# Patient Record
Sex: MALE | Race: AMERICAN INDIAN OR ALASKA NATIVE
[De-identification: names, ages, dates, MRNs, and addresses within clinical notes are randomized per-mention and may not be internally consistent; named-entity substitution may affect disease eponyms.]

---

## 2019-02-26 ENCOUNTER — HOSPITAL ENCOUNTER (EMERGENCY)
Dept: HOSPITAL 5 - ED | Age: 54
Discharge: HOME | End: 2019-02-26
Payer: COMMERCIAL

## 2019-02-26 VITALS — SYSTOLIC BLOOD PRESSURE: 152 MMHG | DIASTOLIC BLOOD PRESSURE: 105 MMHG

## 2019-02-26 DIAGNOSIS — E11.9: ICD-10-CM

## 2019-02-26 DIAGNOSIS — N43.1: Primary | ICD-10-CM

## 2019-02-26 DIAGNOSIS — I10: ICD-10-CM

## 2019-02-26 DIAGNOSIS — F17.200: ICD-10-CM

## 2019-02-26 LAB
BACTERIA #/AREA URNS HPF: (no result) /HPF
BILIRUB UR QL STRIP: (no result)
BLOOD UR QL VISUAL: (no result)
MUCOUS THREADS #/AREA URNS HPF: (no result) /HPF
PH UR STRIP: 5 [PH] (ref 5–7)
RBC #/AREA URNS HPF: 5 /HPF (ref 0–6)
UROBILINOGEN UR-MCNC: < 2 MG/DL (ref ?–2)
WBC #/AREA URNS HPF: 1 /HPF (ref 0–6)

## 2019-02-26 PROCEDURE — 96372 THER/PROPH/DIAG INJ SC/IM: CPT

## 2019-02-26 PROCEDURE — 99284 EMERGENCY DEPT VISIT MOD MDM: CPT

## 2019-02-26 PROCEDURE — 81001 URINALYSIS AUTO W/SCOPE: CPT

## 2019-02-26 PROCEDURE — 93975 VASCULAR STUDY: CPT

## 2019-02-26 NOTE — ULTRASOUND REPORT
FINAL REPORT



EXAM:  US TESTICULAR DOPPLER COMP



HISTORY:  right testicular swelling and cristine pain 



TECHNIQUE:  Grayscale, color flow and Doppler waveform imaging of the scrotal contents was performed.
 



Comparison: None 



FINDINGS:  

Right scrotum:



The right testicle measures 3.1 centimeters by a 3.5 centimeters x 2.7 centimeters. There is no demon
stration of a focal mass.



Arterial and venous flow is demonstrated in the right testicle utilizing color flow and Doppler wavef
orm imaging. 



The right epididymis head measures 7 millimeters and is unremarkable in appearance.



There is a large right hydrocele which contains echogenic foci suggestive of debris. No septations ar
e demonstrated. 



Left scrotum:



The left testicle measures 3 centimeters by 3.5 centimeters x 2.7 centimeters. There is no demonstrat
ion of a focal mass.



Arterial and venous flow is demonstrated in the left testicle utilizing color flow and Doppler wavefo
rm imaging.



The left epididymis measures 8 millimeters and is unremarkable in appearance.



There is a large left hydrocele which contains echogenic foci suggestive of debris. No septations are
 demonstrated. 



In the left inguinal region there is heterogeneous density that is not well investigated with this st
udy but may represent an inguinal hernia.



IMPRESSION:  

1. Bilateral hydroceles which appear to contain debris. This can be seen with chronic hydroceles whic
h contain cholesterol crystals or inflammatory hydroceles which may contain debris. 



2. No evidence of testicular torsion and no ultrasound evidence of testicular abscess. 



3. Possible left inguinal hernia. This is not well investigated with this study. 



If there is a clinical concern for incarcerated hernia, CT abdomen and pelvis would be helpful.

## 2019-02-26 NOTE — EMERGENCY DEPARTMENT REPORT
Blank Doc





- Documentation


Documentation: 





This is a 53 y.o. male that presents with abdominal pain radiating into testicle

for 3 days.  He reports pain started in right testicle which switched to left 

testicle.  There is swelling on right.  He reports pain is worse with sitting.





Ordered: Labs and testicular US

## 2019-11-01 ENCOUNTER — HOSPITAL ENCOUNTER (EMERGENCY)
Dept: HOSPITAL 5 - ED | Age: 54
LOS: 1 days | Discharge: HOME | End: 2019-11-02
Payer: SELF-PAY

## 2019-11-01 DIAGNOSIS — N43.3: Primary | ICD-10-CM

## 2019-11-01 DIAGNOSIS — E11.65: ICD-10-CM

## 2019-11-01 DIAGNOSIS — F17.200: ICD-10-CM

## 2019-11-01 DIAGNOSIS — I10: ICD-10-CM

## 2019-11-01 DIAGNOSIS — Z98.890: ICD-10-CM

## 2019-11-01 LAB
ALBUMIN SERPL-MCNC: 3.8 G/DL (ref 3.9–5)
ALT SERPL-CCNC: 16 UNITS/L (ref 7–56)
BASOPHILS # (AUTO): 0.1 K/MM3 (ref 0–0.1)
BASOPHILS NFR BLD AUTO: 0.6 % (ref 0–1.8)
BILIRUB UR QL STRIP: (no result)
BLOOD UR QL VISUAL: (no result)
BUN SERPL-MCNC: 16 MG/DL (ref 9–20)
BUN/CREAT SERPL: 13 %
CALCIUM SERPL-MCNC: 9 MG/DL (ref 8.4–10.2)
EOSINOPHIL # BLD AUTO: 0.1 K/MM3 (ref 0–0.4)
EOSINOPHIL NFR BLD AUTO: 0.5 % (ref 0–4.3)
HCT VFR BLD CALC: 59.5 % (ref 35.5–45.6)
HEMOLYSIS INDEX: 28
HGB BLD-MCNC: 19.3 GM/DL (ref 11.8–15.2)
LYMPHOCYTES # BLD AUTO: 1.6 K/MM3 (ref 1.2–5.4)
LYMPHOCYTES NFR BLD AUTO: 13.1 % (ref 13.4–35)
MCHC RBC AUTO-ENTMCNC: 32 % (ref 32–34)
MCV RBC AUTO: 91 FL (ref 84–94)
MONOCYTES # (AUTO): 0.8 K/MM3 (ref 0–0.8)
MONOCYTES % (AUTO): 6.7 % (ref 0–7.3)
MUCOUS THREADS #/AREA URNS HPF: (no result) /HPF
PH UR STRIP: 5 [PH] (ref 5–7)
PLATELET # BLD: 264 K/MM3 (ref 140–440)
RBC # BLD AUTO: 6.53 M/MM3 (ref 3.65–5.03)
RBC #/AREA URNS HPF: 4 /HPF (ref 0–6)
UROBILINOGEN UR-MCNC: < 2 MG/DL (ref ?–2)
WBC #/AREA URNS HPF: < 1 /HPF (ref 0–6)

## 2019-11-01 PROCEDURE — 36415 COLL VENOUS BLD VENIPUNCTURE: CPT

## 2019-11-01 PROCEDURE — 74176 CT ABD & PELVIS W/O CONTRAST: CPT

## 2019-11-01 PROCEDURE — 80053 COMPREHEN METABOLIC PANEL: CPT

## 2019-11-01 PROCEDURE — 81001 URINALYSIS AUTO W/SCOPE: CPT

## 2019-11-01 PROCEDURE — 93975 VASCULAR STUDY: CPT

## 2019-11-01 PROCEDURE — 87086 URINE CULTURE/COLONY COUNT: CPT

## 2019-11-01 PROCEDURE — 85025 COMPLETE CBC W/AUTO DIFF WBC: CPT

## 2019-11-01 NOTE — EMERGENCY DEPARTMENT REPORT
Chief Complaint: Urogenital-Male


Stated Complaint: POSS HERNIA





- HPI


History of Present Illness: 


53yo BM states that he has abdominal and L testicle pain that began earlier 

today after straining to use the restroom. He states that he was told a while 

ago that he has suspicion for a hernia but never f/u.








- Exam


Vital Signs: 


                                   Vital Signs











  11/01/19





  17:47


 


Temperature 98.9 F


 


Pulse Rate 91 H


 


Respiratory 18





Rate 


 


Blood Pressure 130/86


 


O2 Sat by Pulse 89





Oximetry 











MSE screening note: 


Focused history and physical exam performed.


Due to findings the following was ordered:











ED Disposition for MSE


Condition: Stable

## 2019-11-01 NOTE — CAT SCAN REPORT
CT ABDOMEN AND PELVIS WITHOUT CONTRAST, 11/1/2019



INDICATION: Generalized abdominal pain and testicular pain



TECHNICAL: Multiple axial CT images of the abdomen and pelvis were acquired without intravenous contr
ast.  Sagittal and coronal reformats were obtained.  All CTs at this facility utilize dose reduction 
techniques including automated exposure control, iterative reconstruction and weight based dosing whe
n appropriate to reduce patient radiation dose to as low as reasonable achievable.



COMPARISON: None



FINDINGS:

Limited imaging of the bilateral lung bases demonstrate no evidence of acute abnormality.



Abdomen: The liver, gallbladder, spleen, pancreas, bilateral adrenal glands and bilateral kidneys sagrario
w no evidence of acute abnormality within the limitations of today's noncontrast study. The large and
 small bowel are normal in caliber. There is no free fluid. The appendix is not clearly identified, b
ut no focal right lower quadrant inflammatory changes are identified.



Pelvis: No free fluid is seen within the pelvis. The urinary bladder is decompressed and not well-vis
ualized.



Evaluation of bony structures demonstrates no evidence of acute bony abnormality



IMPRESSION:

1. No evidence of acute inflammatory or obstructive process within the abdomen or pelvis.



Signer Name: Lana Mcgovern MD 

Signed: 11/1/2019 8:17 PM

 Workstation Name: [id number]

## 2019-11-01 NOTE — EMERGENCY DEPARTMENT REPORT
ED Male  HPI





- General


Chief complaint: Urogenital-Male


Stated complaint: POSS HERNIA


Time Seen by Provider: 19 20:37


Source: patient


Mode of arrival: Ambulatory


Limitations: No Limitations





- History of Present Illness


Initial comments: 





This is a 54-year-old male here for testicular pain and report that he was here 

in the past for the same thing.  He said his left testicle is swollen.  Patient 

is a diabetic.  He said he was prescribed metformin but he does not take it.  

Pain is 6 out of 10 and feels sore and have the.  Patient was here in the past 

with similar issue and was diagnosed with hydrocele.  Patient also said that 

they referred him to a doctor but something got messed up with his insurance and

his insurance is not going to be active until this upcoming January.  There was 

mention of possible hernia but he said that they told him he had a hernia but 

when he came to have the surgery told him that he did not have one in previous 

CT scan did not show any hernia.  He has a history of high blood pressure.  

Denies any penile discharge.  Patient is in the room with his wife.  Denies any 

urinary symptoms or any back pain.  Pain is localized to his left testicle.  

Denies taking any medication


MD Complaint: testicle pain, testicle swelling


Onset/Timin


-: month(s)


Location: left testicle


Radiation: none


Severity: moderate


Severity scale (0 -10): 5


Quality: other (sore)


Consistency: intermittent


Improves with: none


Worsens with: palpation


swelling.  denies: discharge, mass, rash, urinary retention, blood in urine, 

dysuria, fever, nausea/vomiting, incontinence





- Related Data


Sexually active: Yes (with his wife)


                                  Previous Rx's











 Medication  Instructions  Recorded  Last Taken  Type


 


Triamter/Hctz 37.5-25 mg 1 tab PO QDAY #30 tablet 14 Unknown Rx





[Maxzide-25]    


 


Ibuprofen [Motrin 600 MG tab] 600 mg PO Q8H PRN #30 tablet 11/26/15 Unknown Rx


 


HYDROcodone/APAP 5-325 [[place] 1 each PO Q6HR PRN #15 tablet 19 Unknown Rx





5/325]    


 


Doxycycline Hyclate [Doxycycline 100 mg PO Q12HR 10 Days #20 tab 19 

Unknown Rx





Hyclate TAB]    


 


Nystatin Cream [Mycostatin Cream] 1 applic TP BID 7 Days #1 tube 19 

Unknown Rx











                                    Allergies











Allergy/AdvReac Type Severity Reaction Status Date / Time


 


No Known Allergies Allergy   Verified 19 14:08














ED Review of Systems


ROS: 


Stated complaint: POSS HERNIA


Other details as noted in HPI





Constitutional: denies: chills, fever


ENT: denies: throat pain, congestion


Respiratory: denies: cough, wheezing


Cardiovascular: denies: chest pain, palpitations, edema, syncope


Gastrointestinal: denies: abdominal pain, nausea, vomiting


Genitourinary: testicular pain.  denies: urgency, dysuria, frequency, hematuria,

discharge, testicular mass


Musculoskeletal: denies: back pain, joint swelling, arthralgia, myalgia


Skin: denies: rash


Neurological: denies: headache, abnormal gait, vertigo





ED Past Medical Hx





- Past Medical History


Previous Medical History?: Yes


Hx Hypertension: Yes


Hx Diabetes: Yes


Additional medical history: on a blood thinner





- Surgical History


Past Surgical History?: Yes


Additional Surgical History: R knee surgery





- Family History


Family history: diabetes, hypertension





- Social History


Smoking Status: Current Every Day Smoker


Substance Use Type: None





- Medications


Home Medications: 


                                Home Medications











 Medication  Instructions  Recorded  Confirmed  Last Taken  Type


 


Triamter/Hctz 37.5-25 mg 1 tab PO QDAY #30 tablet 06/20/14 11/26/15 Unknown Rx





[Maxzide-25]     


 


Ibuprofen [Motrin 600 MG tab] 600 mg PO Q8H PRN #30 tablet 11/26/15  Unknown Rx


 


HYDROcodone/APAP 5-325 [[place] 1 each PO Q6HR PRN #15 tablet 19  Unknown Rx





5/325]     


 


Doxycycline Hyclate [Doxycycline 100 mg PO Q12HR 10 Days #20 tab 19  

Unknown Rx





Hyclate TAB]     


 


Nystatin Cream [Mycostatin Cream] 1 applic TP BID 7 Days #1 tube 19  

Unknown Rx














ED Physical Exam





- General


Limitations: No Limitations


General appearance: alert, in no apparent distress





- Head


Head exam: Present: atraumatic, normocephalic





- Eye


Eye exam: Present: normal appearance, PERRL





- ENT


ENT exam: Present: normal exam, normal orophraynx





- Respiratory


Respiratory exam: Present: normal lung sounds bilaterally.  Absent: respiratory 

distress, chest wall tenderness





- Cardiovascular


Cardiovascular Exam: Present: regular rate, normal rhythm, normal heart sounds





- GI/Abdominal


GI/Abdominal exam: Present: soft, normal bowel sounds.  Absent: distended, 

tenderness, guarding, rebound, rigid, organomegaly, mass, bruit, pulsatile mass,

hernia





- 


 exam: Present: testicular tenderness (left), scrotal swelling (left, mild).  

Absent: urethral discharge, vertical testicular lie


External exam: Present: swelling (left scrotal area).  Absent: erythema, 

lesions, lacerations, ecchymosis, bleeding





- Expanded  Exam


  ** Expanded


Male  exam: Absent: phimosis, paraphimosis, penile swelling, lesions, 

induration, erythema, perineal induration, balanitis


 exam: Testicular Tenderness: Left, Testicular Swelling: Left





- Extremities Exam


Extremities exam: Present: normal inspection, full ROM.  Absent: pedal edema





- Back Exam


Back exam: Present: normal inspection, full ROM





- Neurological Exam


Neurological exam: Present: alert, oriented X3, normal gait





- Psychiatric


Psychiatric exam: Present: normal affect, normal mood





- Skin


Skin exam: Present: warm, dry, intact, rash, other (patient has dark circular 

rash to bilateral inner thigh and perineal area that looks like a fungal rash.  

No erythema or nontender to palpate.  He reports that sometimes it itches.).  

Absent: normal color





ED Course


                                   Vital Signs











  19





  17:47


 


Temperature 98.9 F


 


Pulse Rate 91 H


 


Respiratory 18





Rate 


 


Blood Pressure 130/86


 


O2 Sat by Pulse 89





Oximetry 














- Reevaluation(s)


Reevaluation #1: 





19 01:17





 stable throughout ED course with no acute distress.








ED Medical Decision Making





- Lab Data


Result diagrams: 


                                 19 18:56





                                 19 18:56








                                   Lab Results











  19 Range/Units





  18:56 18:56 Unknown 


 


WBC  12.0 H    (4.5-11.0)  K/mm3


 


RBC  6.53 H    (3.65-5.03)  M/mm3


 


Hgb  19.3 H    (11.8-15.2)  gm/dl


 


Hct  59.5 H    (35.5-45.6)  %


 


MCV  91    (84-94)  fl


 


MCH  30    (28-32)  pg


 


MCHC  32    (32-34)  %


 


RDW  17.4 H    (13.2-15.2)  %


 


Plt Count  264    (140-440)  K/mm3


 


Lymph % (Auto)  13.1 L    (13.4-35.0)  %


 


Mono % (Auto)  6.7    (0.0-7.3)  %


 


Eos % (Auto)  0.5    (0.0-4.3)  %


 


Baso % (Auto)  0.6    (0.0-1.8)  %


 


Lymph #  1.6    (1.2-5.4)  K/mm3


 


Mono #  0.8    (0.0-0.8)  K/mm3


 


Eos #  0.1    (0.0-0.4)  K/mm3


 


Baso #  0.1    (0.0-0.1)  K/mm3


 


Seg Neutrophils %  79.1 H    (40.0-70.0)  %


 


Seg Neutrophils #  9.5 H    (1.8-7.7)  K/mm3


 


Sodium   139   (137-145)  mmol/L


 


Potassium   4.3   (3.6-5.0)  mmol/L


 


Chloride   97.8 L   ()  mmol/L


 


Carbon Dioxide   29   (22-30)  mmol/L


 


Anion Gap   17   mmol/L


 


BUN   16   (9-20)  mg/dL


 


Creatinine   1.2   (0.8-1.5)  mg/dL


 


Estimated GFR   > 60   ml/min


 


BUN/Creatinine Ratio   13   %


 


Glucose   261 H   ()  mg/dL


 


Calcium   9.0   (8.4-10.2)  mg/dL


 


Total Bilirubin   0.50   (0.1-1.2)  mg/dL


 


AST   16   (5-40)  units/L


 


ALT   16   (7-56)  units/L


 


Alkaline Phosphatase   99   ()  units/L


 


Total Protein   7.8   (6.3-8.2)  g/dL


 


Albumin   3.8 L   (3.9-5)  g/dL


 


Albumin/Globulin Ratio   1.0   %


 


Urine Color    Yellow  (Yellow)  


 


Urine Turbidity    Clear  (Clear)  


 


Urine pH    5.0  (5.0-7.0)  


 


Ur Specific Gravity    1.028  (1.003-1.030)  


 


Urine Protein    100 mg/dl  (Negative)  mg/dL


 


Urine Glucose (UA)    Neg  (Negative)  mg/dL


 


Urine Ketones    Neg  (Negative)  mg/dL


 


Urine Blood    Neg  (Negative)  


 


Urine Nitrite    Neg  (Negative)  


 


Urine Bilirubin    Neg  (Negative)  


 


Urine Urobilinogen    < 2.0  (<2.0)  mg/dL


 


Ur Leukocyte Esterase    Neg  (Negative)  


 


Urine WBC (Auto)    < 1.0  (0.0-6.0)  /HPF


 


Urine RBC (Auto)    4.0  (0.0-6.0)  /HPF


 


U Epithel Cells (Auto)    < 1.0  (0-13.0)  /HPF


 


Urine Mucus    Few  /HPF














- Radiology Data


Radiology results: report reviewed


[facility] CT scan of the abdomen and pelvis with contrast that shows no ab

normality.  The ultrasound shows right and left hydrocele .  Left is greater 

than right .  These see below for details


Findings


[facility]


[address] [place] [address]





Ultrasound Report


Signed





Patient: FAVIAN PATTERSON JR MR#: [id number]


[id number]


: 1965 Acct:[id number]





Age/Sex: 54 / M ADM Date: 19





Loc: ED


Attending Dr:








Ordering Physician: ARYA ROPER


Date of Service: 19


Procedure(s): US testicular doppler comp


Accession Number(s): [id number]





cc: ARYA ROPER








ULTRASOUND SCROTUM





INDICATION / CLINICAL INFORMATION:


testicular pain.





COMPARISON:


CT dated 19. Ultrasound dated 19





FINDINGS -- RIGHT


TESTIS: Size = 4.0 x 2.2 x 2.7 cm.


- Appearance: No significant abnormality.


- Cyst or Mass: None.


- Color Doppler Flow: No significant abnormality.


EPIDIDYMIS: No significant abnormality.


HYDROCELE: Small


VARICOCELE: None demonstrated.





FINDINGS -- LEFT


TESTIS: Size = 3.6 x 2.4 x 2.4 cm.


- Appearance: No significant abnormality.


- Cyst or Mass: None.


- Color Doppler Flow: No significant abnormality.


EPIDIDYMIS: No significant abnormality.


HYDROCELE: Moderate


VARICOCELE: None demonstrated.





ADDITIONAL FINDINGS: None.





IMPRESSION:


1. Moderate left and small right hydroceles similar to that seen on previous 

study.





Signer Name: SHAY De Luna MD


Signed: 2019 12:36 AM


Workstation Name: [id number]








Transcribed By: DT


Dictated By: Jonnathan De Luna MD


Electronically Authenticated By: Jonnathan De Luna MD


Signed Date/Time: 19











DD/DT: 19


TD/TT:








Findings


[facility]


[address] [place] [address]





Cat Scan Report


Signed





Patient: FAVIAN PATTERSON JR MR#: [id number]


[id number]


: 1965 Acct:[id number]





Age/Sex: 54 / M ADM Date: 19





Loc: ED


Attending Dr:








Ordering Physician: JACKELIN MADRID PA-C


Date of Service: 19


Procedure(s): CT abdomen pelvis wo con


Accession Number(s): [id number]





cc: JACKELIN MADRID PA-C








CT ABDOMEN AND PELVIS WITHOUT CONTRAST, 2019





INDICATION: Generalized abdominal pain and testicular pain





TECHNICAL: Multiple axial CT images of the abdomen and pelvis were acquired 

without intravenous


contrast. Sagittal and coronal reformats were obtained. All CTs at this facility

utilize dose


reduction techniques including automated exposure control, iterative 

reconstruction and weight based


dosing when appropriate to reduce patient radiation dose to as low as reasonable

achievable.





COMPARISON: None





FINDINGS:


Limited imaging of the bilateral lung bases demonstrate no evidence of acute 

abnormality.





Abdomen: The liver, gallbladder, spleen, pancreas, bilateral adrenal glands and 

bilateral kidneys


show no evidence of acute abnormality within the limitations of today's 

noncontrast study. The large


and small bowel are normal in caliber. There is no free fluid. The appendix is 

not clearly


identified, but no focal right lower quadrant inflammatory changes are 

identified.





Pelvis: No free fluid is seen within the pelvis. The urinary bladder is 

decompressed and not well-


visualized.





Evaluation of bony structures demonstrates no evidence of acute bony abnormality





IMPRESSION:


1. No evidence of acute inflammatory or obstructive process within the abdomen 

or pelvis.





Signer Name: Lana Mcgovern MD


Signed: 2019 8:17 PM


Workstation Name: [id number]








Transcribed By: EB


Dictated By: Lana Mcgovern MD


Electronically Authenticated By: Lana Mcgovern MD


Signed Date/Time: 19











DD/DT: 19


TD/TT:





- Medical Decision Making





This is a 54-year-old male came to the hospital concerned about left scrotal 

pain and found to have a hydrocele which is chronic on previous ultrasound  

patient had hydrocele and he was referred to urologist because his insurance got

messed up and he will be able to go until this January.  He denies any urinary s

ymptoms.  He was given antibiotics on the last visit referral which she did not 

follow-up.  CBC with mild elevation in white count, CMP with blood glucose 261 

with protein in urine I discussed the patient that he will need to start taking 

his metformin as was previously prescribed and he voiced understanding.  I also 

discussed the ultrasound results which shows left and right hydrocele and CT 

scan of the abdomen and pelvis was normal exam without any hernia.  I discussed 

with him that he has a fungal rash which is like jock itch and that I will 

prescribe a cream for him to put on the area twice daily.  She is stable he 

voiced understanding of diagnosis and treatment plan and that he will follow up 

at [facility] for primary care and urology to manage his chronic 

hydrocele.  Discharged home with prescription for doxycycline and nystatin cream





- Differential Diagnosis


testicular mass, testicular torsion, inguinal hernia, hydrocele


Critical care attestation.: 


If time is entered above; I have spent that time in minutes in the direct care 

of this critically ill patient, excluding procedure time.








ED Disposition


Clinical Impression: 


 Hydrocele, bilateral, Pain in left testicle





Hyperglycemia due to type 2 diabetes mellitus


Qualifiers:


 Diabetes mellitus long term insulin use: without long term use Qualified 

Code(s): E11.65 - Type 2 diabetes mellitus with hyperglycemia





Disposition: - TO HOME OR SELFCARE


Is pt being admited?: No


Does the pt Need Aspirin: No


Condition: Stable


Instructions:  Diabetes Mellitus Type 2 in Adults (ED), Testicle Pain (ED), 

Hydrocele (ED)


Additional Instructions: 


Please start taking her metformin as previously prescribed.


See referral to [facility] and also to urologist for follow-up 

visit for chronic medical problems.


If you condition worsens, please return to emergency room


You have a fungal infection in your groin area and he will need to apply 

antifungal cream as discussed.


Please keep a record of your blood sugar and take to primary care visit with you


Prescriptions: 


Doxycycline Hyclate [Doxycycline Hyclate TAB] 100 mg PO Q12HR 10 Days #20 tab


Nystatin Cream [Mycostatin Cream] 1 applic TP BID 7 Days #1 tube


Referrals: 


[facility], Primary care [Other] - 2-3 Days (Please call on Monday

 to schedule an appointment)


[facility] [Provider Group] - 3-5 Days


Forms:  Accompanied Note, Work/School Release Form(ED)

## 2019-11-02 VITALS — DIASTOLIC BLOOD PRESSURE: 84 MMHG | SYSTOLIC BLOOD PRESSURE: 138 MMHG

## 2019-11-02 NOTE — ULTRASOUND REPORT
ULTRASOUND SCROTUM  

  

INDICATION / CLINICAL INFORMATION:

testicular pain.



COMPARISON:

CT dated 11/01/19. Ultrasound dated 02/26/19



FINDINGS -- RIGHT

TESTIS: Size = 4.0 x 2.2 x 2.7 cm.  

- Appearance: No significant abnormality.  

- Cyst or Mass: None.

- Color Doppler Flow: No significant abnormality.   

EPIDIDYMIS:  No significant abnormality.  

HYDROCELE: Small  

VARICOCELE: None demonstrated.



FINDINGS -- LEFT

TESTIS: Size = 3.6 x 2.4 x 2.4 cm.  

- Appearance: No significant abnormality.  

- Cyst or Mass: None.

- Color Doppler Flow: No significant abnormality.   

EPIDIDYMIS:  No significant abnormality.  

HYDROCELE: Moderate  

VARICOCELE: None demonstrated.



ADDITIONAL FINDINGS: None.



IMPRESSION:

1. Moderate left and small right hydroceles similar to that seen on previous study.



Signer Name: SHAY De Luna MD 

Signed: 11/2/2019 12:36 AM

 Workstation Name: [email or web address]